# Patient Record
Sex: MALE | Race: WHITE | NOT HISPANIC OR LATINO | Employment: FULL TIME | URBAN - METROPOLITAN AREA
[De-identification: names, ages, dates, MRNs, and addresses within clinical notes are randomized per-mention and may not be internally consistent; named-entity substitution may affect disease eponyms.]

---

## 2019-03-11 ENCOUNTER — HISTORICAL (OUTPATIENT)
Dept: RADIOLOGY | Facility: HOSPITAL | Age: 54
End: 2019-03-11

## 2019-04-18 LAB
BUN SERPL-MCNC: 21 MG/DL (ref 7–18)
CALCIUM SERPL-MCNC: 9 MG/DL (ref 8.5–10.1)
CHLORIDE SERPL-SCNC: 103 MMOL/L (ref 98–107)
CO2 SERPL-SCNC: 27 MMOL/L (ref 21–32)
CREAT SERPL-MCNC: 1.06 MG/DL (ref 0.7–1.3)
CREAT/UREA NIT SERPL: 20
EST. AVERAGE GLUCOSE BLD GHB EST-MCNC: 174 MG/DL
GLUCOSE SERPL-MCNC: 198 MG/DL (ref 74–106)
HBA1C MFR BLD: 7.7 % (ref 4.2–6.3)
POTASSIUM SERPL-SCNC: 5.4 MMOL/L (ref 3.5–5.1)
SODIUM SERPL-SCNC: 138 MMOL/L (ref 136–145)

## 2019-04-25 ENCOUNTER — HISTORICAL (OUTPATIENT)
Dept: SURGERY | Facility: HOSPITAL | Age: 54
End: 2019-04-25

## 2020-06-01 ENCOUNTER — HISTORICAL (OUTPATIENT)
Dept: ADMINISTRATIVE | Facility: HOSPITAL | Age: 55
End: 2020-06-01

## 2020-06-18 ENCOUNTER — HISTORICAL (OUTPATIENT)
Dept: RADIOLOGY | Facility: HOSPITAL | Age: 55
End: 2020-06-18

## 2020-06-26 LAB
BUN SERPL-MCNC: 17.4 MG/DL (ref 8.4–25.7)
CALCIUM SERPL-MCNC: 9.4 MG/DL (ref 8.4–10.2)
CHLORIDE SERPL-SCNC: 102 MMOL/L (ref 98–107)
CO2 SERPL-SCNC: 25 MMOL/L (ref 22–29)
CREAT SERPL-MCNC: 0.98 MG/DL (ref 0.72–1.25)
CREAT/UREA NIT SERPL: 18
EST. AVERAGE GLUCOSE BLD GHB EST-MCNC: 154.2 MG/DL
GLUCOSE SERPL-MCNC: 260 MG/DL (ref 74–100)
HBA1C MFR BLD: 7 %
POTASSIUM SERPL-SCNC: 4.2 MMOL/L (ref 3.5–5.1)
SODIUM SERPL-SCNC: 138 MMOL/L (ref 136–145)

## 2020-07-02 ENCOUNTER — HISTORICAL (OUTPATIENT)
Dept: SURGERY | Facility: HOSPITAL | Age: 55
End: 2020-07-02

## 2022-04-09 ENCOUNTER — HISTORICAL (OUTPATIENT)
Dept: ADMINISTRATIVE | Facility: HOSPITAL | Age: 57
End: 2022-04-09
Payer: COMMERCIAL

## 2022-04-29 VITALS
HEIGHT: 72 IN | BODY MASS INDEX: 28.31 KG/M2 | BODY MASS INDEX: 27.41 KG/M2 | HEIGHT: 72 IN | WEIGHT: 209 LBS | SYSTOLIC BLOOD PRESSURE: 132 MMHG | DIASTOLIC BLOOD PRESSURE: 72 MMHG | DIASTOLIC BLOOD PRESSURE: 86 MMHG | WEIGHT: 202.38 LBS | SYSTOLIC BLOOD PRESSURE: 113 MMHG

## 2022-04-30 NOTE — OP NOTE
DATE OF SURGERY:    04/25/2019    SURGEON:  Will Goldman Jr, MD    PREOPERATIVE DIAGNOSES:    1. Left elbow lateral epicondylitis, refractory.  2. Right elbow lateral epicondylitis.    POSTOPERATIVE DIAGNOSES:    1. Left elbow lateral epicondylitis, refractory.  2. Right elbow lateral epicondylitis.    PROCEDURE:    1. Left elbow open lateral epicondylitis debridement with tendon repair.  2. Right elbow lateral epicondyle injection.    ASSISTANT:  None.    ANESTHESIA:  General plus regional.    COMPLICATIONS:  None.    IMPLANTS:  Arthrex.    HISTORY OF PRESENT ILLNESS:  Kulwinder is a very pleasant, 54-year-old, who has had a longstanding history of left lateral epicondylitis which has failed conservative measures.  I recommended repair.  At the same setting, we will go ahead and inject his right elbow.  I discussed with him the risks, benefits, and alternatives to therapy, and he elected to proceed.    PROCEDURE IN DETAIL:  Kulwinder was initially seen in the preoperative unit, where his history and physical were reviewed without change.  His left elbow was marked.  His consents were reviewed.  All questions were answered.  He was taken to the operating room and placed supine on the operating room table where general anesthesia was induced.  His left upper extremity was prepped and draped in a sterile fashion.  Attending led timeout, confirmed the operative side.  Preoperative antibiotics administered at the beginning of the procedure.     I started by making an incision directly over the lateral epicondyle.  I sharply incised the skin and subcutaneous tissue.  I identified the forearm fascia, and then the interval between the ECRL and the EDC.  I came down through here and lifted it up, and was able to visualize the ECRB, which was attenuated and torn.  I cleaned up the inflammatory tissue sharply with a 15 blade, being mindful of moving posteriorly as to not create posterior lateral instability.  Then after  debriding out all inflammatory tissue, I then rongeured back the lateral epicondyle, using a combination of a rongeur and a curet.  I then stimulated the lateral epicondyle with multiple drill holes, and then placed a suture anchor in the center portion of the lateral condyle.  I used these sutures in order to tie back down the tendon to the bone, and then in a side-to-side fashion, I completed the rest of the repair.  Happy with this, I checked his motion of his elbow, which was full, and he had no instability.  His incision was closed in layers.  Sterile dressing was placed.  He was placed into a long-arm splint and awoken from anesthesia.       I then turned my attention to the right elbow, and placed an injection of 1 mL of steroids and 1 mL of bupivacaine onto the lateral condyle.  He tolerated the procedure well and was transferred to the postop unit in good condition.        ______________________________  Will Goldman Jr, MD    BEE/  DD:  04/25/2019  Time:  09:47AM  DT:  04/25/2019  Time:  10:01AM  Job #:  [id number]

## 2022-05-02 NOTE — HISTORICAL [facility] CERNER
This is a historical note converted from Isamar. Formatting and pictures may have been removed.  Please reference Isamar for original formatting and attached multimedia. Chief Complaint  left shoulder pain x ray today no injury or sx  History of Present Illness  He is a pleasant 55-year-old whose had left shoulder pain?since February 2020. ?The pain is located anteriorly over the shoulder. ?He is noticed it worse with extension and internal rotation.? It somewhat better with rest.? He is tried anti-inflammatory medicines without relief. ?He denies any numbness or tingling  Review of Systems  Comprehensive review of system?was performed with no exceptions other than noted in the history of present illness  Physical Exam  Vitals & Measurements  T:?97.7? ?F (Oral)? HR:?72(Apical)? BP:?132/86?  HT:?182.8?cm? WT:?94.8?kg? BMI:?28.37?  Gen: WN, WD, NAD  Card/Res: NL breathing, +distal pulses  Abdomen: ND  Shoulder Exam:??????????Right??????????Left  Skin:??????????????????????????????Normal???????Normal  AC joint tenderness:??????????None??????????None  Forward Flexion:?????????????180 ??????????170  Abduction:?????????????????????180??????????? 180  External Rotation: ????????????? 80??????????????80  Internal Rotation?????????????? 80????????????? 40  Supraspinatus stress test?????? Neg??????????Neg  Rosas Impingement:?? ?????Neg???????????+  Neer Impingement:?????????????Neg??????????? +  Apprehension:???????????????????? Neg??????????Neg  OBriens:????????????????????????????Neg????????? Neg  Speeds test:??????????????????????? Neg??????????Neg  Strength:  External Rotation:???????????????5/5???????????????5/5  Lift Off/belly press:????????????5/5???????????????5/5  ?   N-V status:?????????????????????????Intact??????????Intact  ?   C-spine: Normal ROM, NT  ?  ?  Assessment/Plan  1.?Biceps tendinitis?M75.20  ?We will try an injection today. ?If his pain persist we will get MRI  ?  Risks of cortisone were discussed  with the patient including hypopigmentation subcutaneous fat atrophy, and post injection pain flare. The patient understood these risks and requested to proceed. The left shoulder was prepped with betadine. The 1cc of steroid and 3cc of lidocaine injection was administered under sterile techinique. The injection was administered in clinic by meWill, and the patient tolerated the procedure well.  ?  Ordered:  betamethasone, 12 mg, Intra-Articular, Once, first dose 06/01/20 10:00:00 CDT, stop date 06/01/20 10:00:00 CDT  Lidocaine inj., 2 mL, Intra-Articular, Once, first dose 06/01/20 9:17:00 CDT, stop date 06/01/20 9:17:00 CDT  asp/inj jnt/bursa, major [id number] PC, 06/01/20 9:17:00 CDT, [facility], Routine, 06/01/20 9:17:00 CDT, Biceps tendinitis  Office/Outpatient Visit Level 3 Established [id number] PC, Biceps tendinitis, [facility], 06/01/20 9:17:00 CDT  ?  Orders:  Clinic Follow-up PRN, 06/01/20 9:17:00 CDT, Future Order, [facility]  XR Shoulder Left Minimum 2 Views, Routine, 06/01/20 8:53:00 CDT, None, Ambulatory, Rad Type, Left shoulder pain, Not Scheduled, 06/01/20 8:53:00 CDT  Referrals  Clinic Follow-up PRN, 06/01/20 9:17:00 CDT, Future Order, [facility]   Problem List/Past Medical History  Ongoing  Diabetes mellitus  High cholesterol  Lateral epicondylitis  Pain in left foot  Paresthesia of both feet  Right foot pain  Historical  No qualifying data  Procedure/Surgical History  Epicondyle Repair (Left) (04/25/2019)  Excision of Left Upper Arm Subcutaneous Tissue and Fascia, Open Approach (04/25/2019)  Injection(s); single tendon sheath, or ligament, aponeurosis (eg, plantar fascia) (04/25/2019)  Injection, anesthetic agent; brachial plexus, single (04/25/2019)  Introduction of Anesthetic Agent into Joints, Percutaneous Approach (04/25/2019)  Introduction of Anesthetic Agent into Peripheral Nerves and Plexi, Percutaneous Approach (04/25/2019)  Introduction of Anti-inflammatory  into Joints, Percutaneous Approach (04/25/2019)  Repair Left Upper Arm Tendon, Open Approach (04/25/2019)  Tenotomy, elbow, lateral or medial (eg, epicondylitis, tennis elbow, golfers elbow); debridement, soft tissue and/or bone, open with tendon repair or reattachment (04/25/2019)  Facial Reconstruction  Right Knee   Medications  atorvastatin 20 mg oral tablet, 20 mg= 1 tab(s), Oral, Daily  Celestone, 12 mg, Intra-Articular, Once  codeine-promethazine 10 mg-6.25 mg/5 mL oral syrup, Oral, qPM,? ?Not Taking, Completed Rx: Last Dose Date/Time Unknown  Fish Oil oral capsule, 1 cap(s), Oral, Daily  Foltanx, 1 tab(s), Oral, BID  gabapentin 300 mg oral capsule, 600 mg= 2 cap(s), Oral, TID  glipiZIDE 10 mg oral tablet, extended release (XL tab), 10 mg= 1 tab(s), Oral, Daily  hydrocortisone 10% topical gel, See Instructions  Januvia 100 mg oral tablet, 100 mg= 1 tab(s), Oral, Daily  Januvia 50 mg oral tablet, 50 mg= 1 tab(s), Oral, Daily  lidocaine 2% injectable solution, 2 mL, Intra-Articular, Once  metFORMIN 1000 mg oral tablet, 1000 mg= 1 tab(s), Oral, BID  Mobic 15 mg oral tablet, 15 mg= 1 tab(s), Oral, Daily,? ?Not Taking, Completed Rx: Last Dose Date/Time Unknown  prednisONE 20 mg oral tablet, 20 mg= 1 tab(s), Oral, Daily,? ?Not Taking, Completed Rx: Last Dose Date/Time Unknown  ROSUVASTATIN CALCIUM 10 MG TAB, 10 mg= 1 tab(s), Oral, qPM  Toradol 10 mg oral tablet, 10 mg= 1 tab(s), Oral, q8hr,? ?Not Taking, Completed Rx: Last Dose Date/Time Unknown Last Dose Date/Time Unknown  Vitamin B12  Zofran 4 mg oral tablet, 4 mg= 1 tab(s), Oral, q6hr,? ?Not Taking, Completed Rx: Last Dose Date/Time Unknown  zolpidem 10 mg oral tablet, 10 mg= 1 tab(s), Oral, Once a day (at bedtime), PRN  Allergies  No Known Medication Allergies  Social History  Abuse/Neglect  No, 11/25/2019  Alcohol  Current, Beer, Daily, 04/18/2019  Employment/School  Employed, 04/18/2019  Exercise  Exercise frequency: 1-2 times/week. Exercise type: Walking.,  04/18/2019  Home/Environment  Lives with Children, Spouse., 04/18/2019  Nutrition/Health  Diabetic, 04/18/2019  Substance Use  Never, 10/19/2016  Tobacco  Never (less than 100 in lifetime), No, 11/25/2019  Family History  CAD - Coronary artery disease: Father.  Cardiac arrest.: Father.  Diabetes mellitus type 2: Mother.  Pancreatic cancer: Mother.  Primary malignant neoplasm of brain: Brother.  Health Maintenance  Health Maintenance  ???Pending?(in the next year)  ??? ??OverDue  ??? ? ? ?Diabetes Screening due??and every?  ??? ? ? ?Diabetes Maintenance-Serum Creatinine due??04/18/20??and every 1??year(s)  ??? ??Due?  ??? ? ? ?Depression Screening due??05/05/20??and every 1??year(s)  ??? ? ? ?ADL Screening due??06/01/20??and every 1??year(s)  ??? ? ? ?Aspirin Therapy for CVD Prevention due??06/01/20??and every 1??year(s)  ??? ? ? ?Colorectal Screening due??06/01/20??and every?  ??? ? ? ?Diabetes Maintenance-Eye Exam due??06/01/20??and every?  ??? ? ? ?Diabetes Maintenance-Fasting Lipid Profile due??06/01/20??and every?  ??? ? ? ?Diabetes Maintenance-Foot Exam due??06/01/20??and every?  ??? ? ? ?Diabetes Maintenance-Microalbumin due??06/01/20??Variable frequency  ??? ? ? ?Diabetes Maintenance-Urine Dipstick due??06/01/20??Variable frequency  ??? ? ? ?Tetanus Vaccine due??06/01/20??and every 10??year(s)  ??? ??Due In Future?  ??? ? ? ?Diabetes Maintenance-HgbA1c not due until??07/30/20??and every 1??year(s)  ??? ? ? ?Alcohol Misuse Screening not due until??01/01/21??and every 1??year(s)  ??? ? ? ?Obesity Screening not due until??01/01/21??and every 1??year(s)  ??? ? ? ?Smoking Cessation (Diabetes) not due until??03/12/21??and every 2??year(s)  ???Satisfied?(in the past 1 year)  ??? ??Satisfied?  ??? ? ? ?Alcohol Misuse Screening on??06/01/20.??Satisfied by Peg Bynum  ??? ? ? ?Blood Pressure Screening on??06/01/20.??Satisfied by Peg Bynum  ??? ? ? ?Body Mass Index Check on??06/01/20.??Satisfied by Misa  Peg  ??? ? ? ?Obesity Screening on??06/01/20.??Satisfied by Peg Bynum  ?  Diagnostic Results  Shoulder radiographs show a?no arthrosis.

## 2022-05-02 NOTE — HISTORICAL [facility]
This is a historical note converted from Isamar. Formatting and pictures may have been removed.  Please reference Isamar for original formatting and attached multimedia. History of Present Illness  He is a pleasant 53-year-old male whose had?bilateral elbow pain right greater than left since 2016. ?The pains located?over his lateral epicondyles. 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 The?pain is worse with motion. ?Somewhat better with rest. ?He is tried?inflammatory medicines without significant relief. ?He had injections with steroids in the past without relief. ?He is tried formal physical therapy?and bracing. ?Despite he said continued pain.? He denies any numbness or tingling [1]  ?   We tried a steroid injection October 2018 & in December 2018?with some transient relief. He returns status post MRI. [1]  Review of Systems  Comprehensive review of system?was performed with no exceptions other than noted in the history of present illness [2]  Physical Exam  Vitals & Measurements  T:?36.6? ?C (Temporal Artery)? HR:?75(Monitored)? RR:?20? BP:?141/73? SpO2:?93%? WT:?94.8?kg?  Gen: WN, WD, NAD  Card/Res: NL breathing, +distal pulses  Abdomen: ND  Muscular skeletal: He has tender palpation of her bilateral lateral epicondyles. ?He is got pain with forced wrist extension and finger extension.? Distally his neurovascular intact [3]  Assessment/Plan  1.?Lateral epicondylitis?M77.10  ? I recommended surgical intervention:?Left?open?lateral epicondyle debridement and repair,?right lateral epicondyle injection. [4]  Orders:  ceFAZolin, 2 gm, form: Infusion, IV Piggyback, On Call, Infuse over: 30 minute(s), first dose 04/25/19 6:13:00 CDT  Consult to Anesthesia, 04/25/19 6:13:00 CDT, Evaluate for Block  NPO, 04/25/19 6:13:00 CDT, CM NPO  Obtain consent, 04/25/19 6:13:00 CDT, Constant Order, Please have consent signed and witnessed; Consent on chart; Already discussed risks, benefits, and options with patient /  family  Peripheral IV Insertion, 04/25/19 6:13:00 CDT, 18 gauge  Place in Outpatient Bedded Status, 04/25/19 6:13:00 CDT, Ambulatory Surgery Jones PEREZ MD, Will SANTO, No  Prep and Clip, 04/25/19 6:13:00 CDT, Cleanse operative extremity and please clip operative extremity if needed   Problem List/Past Medical History  Ongoing  Diabetes mellitus  High cholesterol  Lateral epicondylitis  Pain in left foot  Paresthesia of both feet  Right foot pain  Historical  No qualifying data  Procedure/Surgical History  Facial Reconstruction  Right Knee   Medications  Inpatient  cefazolin 2gm/50ml D5W (Premix), 2 gm= 50 mL, IV Piggyback, On Call  Home  atorvastatin 20 mg oral tablet, 20 mg= 1 tab(s), Oral, Daily,? ?Not Taking per Prescriber: change in medication  codeine-promethazine 10 mg-6.25 mg/5 mL oral syrup, Oral, qPM,? ?Not Taking, Completed Rx  Fish Oil oral capsule, 1 cap(s), Oral, Daily  Foltanx, 1 tab(s), Oral, BID,? ?Not taking  gabapentin 300 mg oral capsule, 600 mg= 2 cap(s), Oral, TID  glipiZIDE 10 mg oral tablet, extended release (XL tab), 10 mg= 1 tab(s), Oral, Daily  hydrocortisone 10% topical gel, See Instructions,? ?Not Taking, Completed Rx  Januvia 100 mg oral tablet, 100 mg= 1 tab(s), Oral, Daily,? ?Not Taking per Prescriber: change in dosage  Januvia 50 mg oral tablet, 50 mg= 1 tab(s), Oral, Daily  metFORMIN 1000 mg oral tablet, 1000 mg= 1 tab(s), Oral, BID  Mobic 15 mg oral tablet, 15 mg= 1 tab(s), Oral, Daily,? ?Not taking  prednisONE 20 mg oral tablet, 20 mg= 1 tab(s), Oral, Daily,? ?Not Taking, Completed Rx  ROSUVASTATIN CALCIUM 10 MG TAB, 10 mg= 1 tab(s), Oral, qPM  Vitamin B12  zolpidem 10 mg oral tablet, 10 mg= 1 tab(s), Oral, Once a day (at bedtime), PRN  Allergies  No Known Medication Allergies  Social History  Alcohol  Current, Beer, Daily, 04/18/2019  Current, Beer, 1-2 times per week, 10/19/2016  Employment/School  Employed, 04/18/2019  Exercise  Exercise frequency: 1-2 times/week. Exercise type:  Walking., 04/18/2019  Home/Environment  Lives with Children, Spouse., 04/18/2019  Nutrition/Health  Diabetic, 04/18/2019  Substance Abuse  Never, 10/19/2016  Tobacco  Never (less than 100 in lifetime), N/A, 04/25/2019  Never (less than 100 in lifetime), N/A, 04/18/2019  Never (less than 100 in lifetime), No, 03/13/2019  Never (less than 100 in lifetime), No, 03/04/2019  Never smoker, No, 12/26/2018  Never smoker, 10/19/2016  Family History  CAD - Coronary artery disease: Father.  Cardiac arrest.: Father.  Diabetes mellitus type 2: Mother.  Pancreatic cancer: Mother.  Primary malignant neoplasm of brain: Brother.     [1]?Office Visit Note; Will Goldman JR., MD 03/13/2019 16:48 CDT  [2]?Office Visit Note; Will Goldman JR., MD 03/13/2019 16:48 CDT  [3]?Office Visit Note; Will Goldman JR., MD 03/13/2019 16:48 CDT  [4]?Office Visit Note; Will Goldman JR., MD 03/13/2019 16:48 CDT

## 2022-05-17 DIAGNOSIS — M75.20 BICEPS TENDINITIS, UNSPECIFIED LATERALITY: Primary | ICD-10-CM

## 2022-05-17 DIAGNOSIS — M25.511 ACUTE PAIN OF RIGHT SHOULDER: ICD-10-CM

## 2022-05-31 ENCOUNTER — HOSPITAL ENCOUNTER (OUTPATIENT)
Dept: RADIOLOGY | Facility: HOSPITAL | Age: 57
Discharge: HOME | End: 2022-05-31
Attending: ORTHOPAEDIC SURGERY
Payer: COMMERCIAL

## 2022-05-31 DIAGNOSIS — M25.511 ACUTE PAIN OF RIGHT SHOULDER: ICD-10-CM

## 2022-05-31 DIAGNOSIS — M75.20 BICEPS TENDINITIS, UNSPECIFIED LATERALITY: ICD-10-CM

## 2022-05-31 PROCEDURE — 73221 MRI JOINT UPR EXTREM W/O DYE: CPT | Mod: TC,RT

## 2022-06-01 ENCOUNTER — OFFICE VISIT (OUTPATIENT)
Dept: ORTHOPEDICS | Facility: CLINIC | Age: 57
End: 2022-06-01
Payer: COMMERCIAL

## 2022-06-01 VITALS — BODY MASS INDEX: 27.41 KG/M2 | HEIGHT: 72 IN | WEIGHT: 202.38 LBS

## 2022-06-01 DIAGNOSIS — S43.431A SUPERIOR GLENOID LABRUM LESION OF RIGHT SHOULDER, INITIAL ENCOUNTER: Primary | ICD-10-CM

## 2022-06-01 DIAGNOSIS — M75.01 ADHESIVE CAPSULITIS OF RIGHT SHOULDER: ICD-10-CM

## 2022-06-01 PROCEDURE — 20610 LARGE JOINT ASPIRATION/INJECTION: R SUBACROMIAL BURSA: ICD-10-PCS | Mod: RT,,, | Performed by: ORTHOPAEDIC SURGERY

## 2022-06-01 PROCEDURE — 1159F MED LIST DOCD IN RCRD: CPT | Mod: CPTII,,, | Performed by: ORTHOPAEDIC SURGERY

## 2022-06-01 PROCEDURE — 3008F BODY MASS INDEX DOCD: CPT | Mod: CPTII,,, | Performed by: ORTHOPAEDIC SURGERY

## 2022-06-01 PROCEDURE — 99213 OFFICE O/P EST LOW 20 MIN: CPT | Mod: 25,,, | Performed by: ORTHOPAEDIC SURGERY

## 2022-06-01 PROCEDURE — 99213 PR OFFICE/OUTPT VISIT, EST, LEVL III, 20-29 MIN: ICD-10-PCS | Mod: 25,,, | Performed by: ORTHOPAEDIC SURGERY

## 2022-06-01 PROCEDURE — 20610 DRAIN/INJ JOINT/BURSA W/O US: CPT | Mod: RT,,, | Performed by: ORTHOPAEDIC SURGERY

## 2022-06-01 PROCEDURE — 1159F PR MEDICATION LIST DOCUMENTED IN MEDICAL RECORD: ICD-10-PCS | Mod: CPTII,,, | Performed by: ORTHOPAEDIC SURGERY

## 2022-06-01 PROCEDURE — 3008F PR BODY MASS INDEX (BMI) DOCUMENTED: ICD-10-PCS | Mod: CPTII,,, | Performed by: ORTHOPAEDIC SURGERY

## 2022-06-01 RX ORDER — ORAL SEMAGLUTIDE 14 MG/1
14 TABLET ORAL DAILY
COMMUNITY
Start: 2022-05-23

## 2022-06-01 RX ORDER — SODIUM CHLORIDE 9 MG/ML
INJECTION, SOLUTION INTRAVENOUS CONTINUOUS
Status: CANCELLED | OUTPATIENT
Start: 2022-07-14

## 2022-06-01 RX ORDER — LIDOCAINE HYDROCHLORIDE 20 MG/ML
5 INJECTION, SOLUTION EPIDURAL; INFILTRATION; INTRACAUDAL; PERINEURAL
Status: DISCONTINUED | OUTPATIENT
Start: 2022-06-01 | End: 2022-06-01 | Stop reason: HOSPADM

## 2022-06-01 RX ORDER — ZOLPIDEM TARTRATE 10 MG/1
10 TABLET ORAL NIGHTLY PRN
COMMUNITY
Start: 2022-05-09

## 2022-06-01 RX ORDER — CEFAZOLIN SODIUM 2 G/50ML
2 SOLUTION INTRAVENOUS
Status: CANCELLED | OUTPATIENT
Start: 2022-07-14

## 2022-06-01 RX ORDER — ROSUVASTATIN CALCIUM 40 MG/1
40 TABLET, COATED ORAL NIGHTLY
COMMUNITY
Start: 2022-05-23

## 2022-06-01 RX ORDER — GABAPENTIN 300 MG/1
300 CAPSULE ORAL 3 TIMES DAILY
COMMUNITY
Start: 2022-05-10

## 2022-06-01 RX ORDER — MUPIROCIN 20 MG/G
OINTMENT TOPICAL
Status: CANCELLED | OUTPATIENT
Start: 2022-07-14

## 2022-06-01 RX ORDER — EMPAGLIFLOZIN 25 MG/1
25 TABLET, FILM COATED ORAL DAILY
COMMUNITY
Start: 2022-05-09

## 2022-06-01 RX ORDER — GLIPIZIDE 10 MG/1
10 TABLET, FILM COATED, EXTENDED RELEASE ORAL 2 TIMES DAILY
COMMUNITY
Start: 2022-03-28

## 2022-06-01 RX ORDER — BETAMETHASONE SODIUM PHOSPHATE AND BETAMETHASONE ACETATE 3; 3 MG/ML; MG/ML
6 INJECTION, SUSPENSION INTRA-ARTICULAR; INTRALESIONAL; INTRAMUSCULAR; SOFT TISSUE
Status: DISCONTINUED | OUTPATIENT
Start: 2022-06-01 | End: 2022-06-01 | Stop reason: HOSPADM

## 2022-06-01 RX ORDER — METFORMIN HYDROCHLORIDE 1000 MG/1
1000 TABLET ORAL 2 TIMES DAILY WITH MEALS
COMMUNITY
Start: 2022-04-13

## 2022-06-01 RX ADMIN — BETAMETHASONE SODIUM PHOSPHATE AND BETAMETHASONE ACETATE 6 MG: 3; 3 INJECTION, SUSPENSION INTRA-ARTICULAR; INTRALESIONAL; INTRAMUSCULAR; SOFT TISSUE at 03:06

## 2022-06-01 RX ADMIN — LIDOCAINE HYDROCHLORIDE 5 ML: 20 INJECTION, SOLUTION EPIDURAL; INFILTRATION; INTRACAUDAL; PERINEURAL at 03:06

## 2022-06-01 NOTE — PROGRESS NOTES
Chief Complaint: No chief complaint on file.      Consulting Physician: No ref. provider found    History of present illness:  He is a pleasant 57-year-old has had right shoulder pain since October 2021. The pains located anteriorly over the shoulder. He notes it worse with abduction and reaching behind him. It somewhat better at rest. He is tried anti-inflammatory medicines and home directed exercise program without relief since October 2021. He denies any new numbness or tingling.    We tried a steroid injection in January of 2022.  Unfortunately his pain has persisted.  He status post MRI      No past medical history on file.    No past surgical history on file.    No current outpatient medications on file.     No current facility-administered medications for this visit.       Review of patient's allergies indicates:  Not on File    No family history on file.    Social History     Socioeconomic History    Marital status:        Review of Systems:    Constitution:   Denies chills, fever, and sweats.  HENT:   Denies headaches or blurry vision.  Cardiovascular:  Denies chest pain or irregular heart beat.  Respiratory:   Denies cough or shortness of breath.  Gastrointestinal:  Denies abdominal pain, nausea, or vomiting.  Musculoskeletal:   Denies muscle cramps.  Neurological:   Denies dizziness or focal weakness.  Psychiatric/Behavior: Normal mental status.  Hematology/Lymph:  Denies bleeding problem or easy bruising/bleeding.  Skin:    Denies rash or suspicious lesions.    Examination:    Vital Signs:  There were no vitals filed for this visit.    There is no height or weight on file to calculate BMI.    Constitution:   Well-developed, well nourished patient in no acute distress.  Neurological:   Alert and oriented x 3 and cooperative to examination.     Psychiatric/Behavior: Normal mental status.  Respiratory:   No shortness of breath.  Eyes:    Extraoccular muscles intact  Skin:    No scars, rash or  suspicious lesions.    MSK:   .Shoulder Exam:                   Right        Left  Skin:                                   Normal     Normal  AC joint tenderness:           None         None  Forward Flexion:                100            180  Abduction:                          100           180  External Rotation:               40              80  Internal Rotation:                10             80  Supraspinatus stress test: Neg           Neg  Hawkin's Impingement:       +           Neg  Neer Impingement:              +           Neg  Apprehension:                   Neg           Neg  [place]'s:                            +           Neg  Speed's test:                     Neg            Neg  Strength:  External Rotation:           5/5                5/5  Lift Off/belly press:          5/5                5/5    N-V status:                   Intact             Intact    C-spine: Normal ROM, NT      Imaging: FINDINGS:  Severe edematous thickening of the axillary recess extending into the shoulder capsule essentially circumferentially.  Type 3 superior labral tear without involvement of the biceps tendon anchor.       Assessment: Superior glenoid labrum lesion of right shoulder, initial encounter    Adhesive capsulitis of right shoulder        Plan:  I recommend surgical intervention:  Right shoulder arthroscopic biceps tenodesis, lysis of adhesions, manipulation under anesthesia.  We discussed the details of the procedure and expected postoperative course.  We discussed the benefits of surgery which we did decrease his pain and increase his function.  We discussed the risks of surgery which is small but could be significant if he has continued pain, stiffness, or infection.  After discussion he would like to proceed.  Plans for surgery July 12

## 2022-06-01 NOTE — PROCEDURES
Large Joint Aspiration/Injection: R subacromial bursa    Date/Time: 6/1/2022 3:15 PM  Performed by: Will Goldman Jr., MD  Authorized by: Will Goldman Jr., MD     Consent Done?:  Yes (Verbal)  Indications:  Arthritis  Site marked: the procedure site was marked    Timeout: prior to procedure the correct patient, procedure, and site was verified    Prep: patient was prepped and draped in usual sterile fashion    Local anesthesia used?: No      Details:  Needle Size:  21 G  Ultrasonic Guidance for needle placement?: No    Approach:  Lateral  Location:  Shoulder  Site:  R subacromial bursa  Medications:  5 mL LIDOcaine (PF) 20 mg/mL (2%) 20 mg/mL (2 %); 6 mg betamethasone acetate-betamethasone sodium phosphate 6 mg/mL  Patient tolerance:  Patient tolerated the procedure well with no immediate complications

## 2022-07-07 PROCEDURE — 93005 ELECTROCARDIOGRAM TRACING: CPT | Mod: ,,, | Performed by: ORTHOPAEDIC SURGERY

## 2022-07-07 PROCEDURE — 93005 EKG 12-LEAD: ICD-10-PCS | Mod: ,,, | Performed by: ORTHOPAEDIC SURGERY

## 2022-07-11 NOTE — H&P
Chief Complaint: No chief complaint on file.      Consulting Physician: Self, Aaareferral    History of present illness:  He is a pleasant 57-year-old has had right shoulder pain since October 2021. The pains located anteriorly over the shoulder. He notes it worse with abduction and reaching behind him. It somewhat better at rest. He is tried anti-inflammatory medicines and home directed exercise program without relief since October 2021. He denies any new numbness or tingling.    We tried a steroid injection in January of 2022.  Unfortunately his pain has persisted.  He status post MRI      No past medical history on file.    No past surgical history on file.    No current facility-administered medications for this encounter.     Current Outpatient Medications   Medication Sig    gabapentin (NEURONTIN) 300 MG capsule     glipiZIDE (GLUCOTROL) 10 MG TR24     JARDIANCE 25 mg tablet     linaCLOtide (LINZESS) 145 mcg Cap capsule Take 145 mcg by mouth.    metFORMIN (GLUCOPHAGE) 1000 MG tablet     rosuvastatin (CRESTOR) 40 MG Tab     RYBELSUS 14 mg tablet     zolpidem (AMBIEN) 10 mg Tab        Review of patient's allergies indicates:  No Known Allergies    No family history on file.    Social History     Socioeconomic History    Marital status:    Tobacco Use    Smoking status: Never Smoker    Smokeless tobacco: Former User   Substance and Sexual Activity    Alcohol use: Never    Drug use: Never       Review of Systems:    Constitution:   Denies chills, fever, and sweats.  HENT:   Denies headaches or blurry vision.  Cardiovascular:  Denies chest pain or irregular heart beat.  Respiratory:   Denies cough or shortness of breath.  Gastrointestinal:  Denies abdominal pain, nausea, or vomiting.  Musculoskeletal:   Denies muscle cramps.  Neurological:   Denies dizziness or focal weakness.  Psychiatric/Behavior: Normal mental status.  Hematology/Lymph:  Denies bleeding problem or easy  bruising/bleeding.  Skin:    Denies rash or suspicious lesions.    Examination:    Vital Signs:  There were no vitals filed for this visit.    There is no height or weight on file to calculate BMI.    Constitution:   Well-developed, well nourished patient in no acute distress.  Neurological:   Alert and oriented x 3 and cooperative to examination.     Psychiatric/Behavior: Normal mental status.  Respiratory:   No shortness of breath.  Eyes:    Extraoccular muscles intact  Skin:    No scars, rash or suspicious lesions.    MSK:   .Shoulder Exam:                   Right        Left  Skin:                                   Normal     Normal  AC joint tenderness:           None         None  Forward Flexion:                100            180  Abduction:                          100           180  External Rotation:               40              80  Internal Rotation:                10             80  Supraspinatus stress test: Neg           Neg  Hawkin's Impingement:       +           Neg  Neer Impingement:              +           Neg  Apprehension:                   Neg           Neg  [place]'s:                            +           Neg  Speed's test:                     Neg            Neg  Strength:  External Rotation:           5/5                5/5  Lift Off/belly press:          5/5                5/5    N-V status:                   Intact             Intact    C-spine: Normal ROM, NT      Imaging: FINDINGS:  Severe edematous thickening of the axillary recess extending into the shoulder capsule essentially circumferentially.  Type 3 superior labral tear without involvement of the biceps tendon anchor.       Assessment: Superior glenoid labrum lesion of right shoulder, initial encounter     Adhesive capsulitis of right shoulder    Plan:  I recommend surgical intervention:  Right shoulder arthroscopic biceps tenodesis, lysis of adhesions, manipulation under anesthesia.  We discussed the details of the procedure  and expected postoperative course.  We discussed the benefits of surgery which we did decrease his pain and increase his function.  We discussed the risks of surgery which is small but could be significant if he has continued pain, stiffness, or infection.  After discussion he would like to proceed.  Plans for surgery July 12

## 2022-07-12 RX ORDER — PNV NO.95/FERROUS FUM/FOLIC AC 28MG-0.8MG
100 TABLET ORAL DAILY
COMMUNITY

## 2022-07-12 RX ORDER — UBIDECARENONE 30 MG
30 CAPSULE ORAL 3 TIMES DAILY
COMMUNITY

## 2022-07-13 ENCOUNTER — ANESTHESIA EVENT (OUTPATIENT)
Dept: SURGERY | Facility: HOSPITAL | Age: 57
End: 2022-07-13
Payer: COMMERCIAL

## 2022-07-14 ENCOUNTER — ANESTHESIA (OUTPATIENT)
Dept: SURGERY | Facility: HOSPITAL | Age: 57
End: 2022-07-14
Payer: COMMERCIAL

## 2022-07-14 ENCOUNTER — HOSPITAL ENCOUNTER (OUTPATIENT)
Facility: HOSPITAL | Age: 57
Discharge: HOME | End: 2022-07-14
Attending: ORTHOPAEDIC SURGERY | Admitting: ORTHOPAEDIC SURGERY
Payer: COMMERCIAL

## 2022-07-14 DIAGNOSIS — S43.431A SUPERIOR GLENOID LABRUM LESION OF RIGHT SHOULDER, INITIAL ENCOUNTER: Primary | ICD-10-CM

## 2022-07-14 DIAGNOSIS — M75.01 ADHESIVE CAPSULITIS OF RIGHT SHOULDER: ICD-10-CM

## 2022-07-14 LAB — POCT GLUCOSE: 121 MG/DL (ref 70–110)

## 2022-07-14 PROCEDURE — 29823 SHO ARTHRS SRG XTNSV DBRDMT: CPT | Mod: AS,51,RT, | Performed by: NURSE PRACTITIONER

## 2022-07-14 PROCEDURE — 29828 PR ARTHROSCOPY SHOULDER SURGICAL BICEPS TENODESIS: ICD-10-PCS | Mod: RT,,, | Performed by: ORTHOPAEDIC SURGERY

## 2022-07-14 PROCEDURE — 63600175 PHARM REV CODE 636 W HCPCS

## 2022-07-14 PROCEDURE — 36000711: Performed by: ORTHOPAEDIC SURGERY

## 2022-07-14 PROCEDURE — 63600175 PHARM REV CODE 636 W HCPCS: Performed by: NURSE ANESTHETIST, CERTIFIED REGISTERED

## 2022-07-14 PROCEDURE — 71000016 HC POSTOP RECOV ADDL HR: Performed by: ORTHOPAEDIC SURGERY

## 2022-07-14 PROCEDURE — 64415 NJX AA&/STRD BRCH PLXS IMG: CPT | Mod: 59,RT | Performed by: ANESTHESIOLOGY

## 2022-07-14 PROCEDURE — 71000015 HC POSTOP RECOV 1ST HR: Performed by: ORTHOPAEDIC SURGERY

## 2022-07-14 PROCEDURE — 29828 SHO ARTHRS SRG BICP TENODSIS: CPT | Mod: RT,,, | Performed by: ORTHOPAEDIC SURGERY

## 2022-07-14 PROCEDURE — 29828 SHO ARTHRS SRG BICP TENODSIS: CPT | Mod: AS,RT,, | Performed by: NURSE PRACTITIONER

## 2022-07-14 PROCEDURE — 36000710: Performed by: ORTHOPAEDIC SURGERY

## 2022-07-14 PROCEDURE — 37000008 HC ANESTHESIA 1ST 15 MINUTES: Performed by: ORTHOPAEDIC SURGERY

## 2022-07-14 PROCEDURE — 25000003 PHARM REV CODE 250

## 2022-07-14 PROCEDURE — 27201423 OPTIME MED/SURG SUP & DEVICES STERILE SUPPLY: Performed by: ORTHOPAEDIC SURGERY

## 2022-07-14 PROCEDURE — 25000003 PHARM REV CODE 250: Performed by: NURSE ANESTHETIST, CERTIFIED REGISTERED

## 2022-07-14 PROCEDURE — 29823 PR SHLDR ARTHROSCOP,EXTEN DEBRIDE: ICD-10-PCS | Mod: AS,51,RT, | Performed by: NURSE PRACTITIONER

## 2022-07-14 PROCEDURE — 82962 GLUCOSE BLOOD TEST: CPT | Performed by: ORTHOPAEDIC SURGERY

## 2022-07-14 PROCEDURE — 29823 PR SHLDR ARTHROSCOP,EXTEN DEBRIDE: ICD-10-PCS | Mod: 51,RT,, | Performed by: ORTHOPAEDIC SURGERY

## 2022-07-14 PROCEDURE — 63600175 PHARM REV CODE 636 W HCPCS: Performed by: ANESTHESIOLOGY

## 2022-07-14 PROCEDURE — 37000009 HC ANESTHESIA EA ADD 15 MINS: Performed by: ORTHOPAEDIC SURGERY

## 2022-07-14 PROCEDURE — 29823 SHO ARTHRS SRG XTNSV DBRDMT: CPT | Mod: 51,RT,, | Performed by: ORTHOPAEDIC SURGERY

## 2022-07-14 PROCEDURE — 29828 PR ARTHROSCOPY SHOULDER SURGICAL BICEPS TENODESIS: ICD-10-PCS | Mod: AS,RT,, | Performed by: NURSE PRACTITIONER

## 2022-07-14 PROCEDURE — 71000039 HC RECOVERY, EACH ADD'L HOUR: Performed by: ORTHOPAEDIC SURGERY

## 2022-07-14 PROCEDURE — 71000033 HC RECOVERY, INTIAL HOUR: Performed by: ORTHOPAEDIC SURGERY

## 2022-07-14 PROCEDURE — C1713 ANCHOR/SCREW BN/BN,TIS/BN: HCPCS | Performed by: ORTHOPAEDIC SURGERY

## 2022-07-14 DEVICE — SWVLK TENO BIO-COMP 7X 19.5MM
Type: IMPLANTABLE DEVICE | Site: SHOULDER | Status: FUNCTIONAL
Brand: ARTHREX®

## 2022-07-14 RX ORDER — PHENYLEPHRINE HYDROCHLORIDE 10 MG/ML
INJECTION INTRAVENOUS
Status: DISCONTINUED | OUTPATIENT
Start: 2022-07-14 | End: 2022-07-14

## 2022-07-14 RX ORDER — ROPIVACAINE HYDROCHLORIDE 5 MG/ML
INJECTION, SOLUTION EPIDURAL; INFILTRATION; PERINEURAL
Status: COMPLETED
Start: 2022-07-14 | End: 2022-07-14

## 2022-07-14 RX ORDER — DEXAMETHASONE SODIUM PHOSPHATE 4 MG/ML
INJECTION, SOLUTION INTRA-ARTICULAR; INTRALESIONAL; INTRAMUSCULAR; INTRAVENOUS; SOFT TISSUE
Status: DISCONTINUED | OUTPATIENT
Start: 2022-07-14 | End: 2022-07-14

## 2022-07-14 RX ORDER — FENTANYL CITRATE 50 UG/ML
100 INJECTION, SOLUTION INTRAMUSCULAR; INTRAVENOUS ONCE
Status: COMPLETED | OUTPATIENT
Start: 2022-07-14 | End: 2022-07-14

## 2022-07-14 RX ORDER — LIDOCAINE HYDROCHLORIDE 20 MG/ML
INJECTION, SOLUTION EPIDURAL; INFILTRATION; INTRACAUDAL; PERINEURAL
Status: DISCONTINUED | OUTPATIENT
Start: 2022-07-14 | End: 2022-07-14

## 2022-07-14 RX ORDER — EPINEPHRINE 1 MG/ML
INJECTION, SOLUTION INTRACARDIAC; INTRAMUSCULAR; INTRAVENOUS; SUBCUTANEOUS
Status: DISCONTINUED
Start: 2022-07-14 | End: 2022-07-14 | Stop reason: HOSPADM

## 2022-07-14 RX ORDER — METHOCARBAMOL 750 MG/1
750 TABLET, FILM COATED ORAL 3 TIMES DAILY
Qty: 21 TABLET | Refills: 0 | Status: SHIPPED | OUTPATIENT
Start: 2022-07-14

## 2022-07-14 RX ORDER — HYDROCODONE BITARTRATE AND ACETAMINOPHEN 5; 325 MG/1; MG/1
1 TABLET ORAL EVERY 4 HOURS PRN
Status: DISCONTINUED | OUTPATIENT
Start: 2022-07-14 | End: 2022-07-15 | Stop reason: HOSPADM

## 2022-07-14 RX ORDER — KETOROLAC TROMETHAMINE 30 MG/ML
30 INJECTION, SOLUTION INTRAMUSCULAR; INTRAVENOUS ONCE
Status: COMPLETED | OUTPATIENT
Start: 2022-07-14 | End: 2022-07-14

## 2022-07-14 RX ORDER — PROPOFOL 10 MG/ML
VIAL (ML) INTRAVENOUS
Status: DISCONTINUED | OUTPATIENT
Start: 2022-07-14 | End: 2022-07-14

## 2022-07-14 RX ORDER — ROCURONIUM BROMIDE 10 MG/ML
INJECTION, SOLUTION INTRAVENOUS
Status: DISCONTINUED | OUTPATIENT
Start: 2022-07-14 | End: 2022-07-14

## 2022-07-14 RX ORDER — KETOROLAC TROMETHAMINE 10 MG/1
10 TABLET, FILM COATED ORAL 3 TIMES DAILY
Qty: 15 TABLET | Refills: 0 | Status: SHIPPED | OUTPATIENT
Start: 2022-07-14

## 2022-07-14 RX ORDER — TRAMADOL HYDROCHLORIDE 50 MG/1
50 TABLET ORAL EVERY 4 HOURS PRN
Status: DISCONTINUED | OUTPATIENT
Start: 2022-07-14 | End: 2022-07-15 | Stop reason: HOSPADM

## 2022-07-14 RX ORDER — CEFAZOLIN SODIUM 2 G/50ML
2 SOLUTION INTRAVENOUS
Status: DISCONTINUED | OUTPATIENT
Start: 2022-07-14 | End: 2022-07-15 | Stop reason: HOSPADM

## 2022-07-14 RX ORDER — ONDANSETRON 2 MG/ML
4 INJECTION INTRAMUSCULAR; INTRAVENOUS EVERY 12 HOURS PRN
Status: DISCONTINUED | OUTPATIENT
Start: 2022-07-14 | End: 2022-07-15 | Stop reason: HOSPADM

## 2022-07-14 RX ORDER — MIDAZOLAM HYDROCHLORIDE 1 MG/ML
2 INJECTION INTRAMUSCULAR; INTRAVENOUS ONCE
Status: COMPLETED | OUTPATIENT
Start: 2022-07-14 | End: 2022-07-14

## 2022-07-14 RX ORDER — ROPIVACAINE HYDROCHLORIDE 5 MG/ML
INJECTION, SOLUTION EPIDURAL; INFILTRATION; PERINEURAL
Status: COMPLETED | OUTPATIENT
Start: 2022-07-14 | End: 2022-07-14

## 2022-07-14 RX ORDER — MIDAZOLAM HYDROCHLORIDE 1 MG/ML
INJECTION INTRAMUSCULAR; INTRAVENOUS
Status: COMPLETED
Start: 2022-07-14 | End: 2022-07-14

## 2022-07-14 RX ORDER — MORPHINE SULFATE 4 MG/ML
3 INJECTION, SOLUTION INTRAMUSCULAR; INTRAVENOUS
Status: DISCONTINUED | OUTPATIENT
Start: 2022-07-14 | End: 2022-07-15 | Stop reason: HOSPADM

## 2022-07-14 RX ORDER — FENTANYL CITRATE 50 UG/ML
INJECTION, SOLUTION INTRAMUSCULAR; INTRAVENOUS
Status: COMPLETED
Start: 2022-07-14 | End: 2022-07-14

## 2022-07-14 RX ORDER — KETOROLAC TROMETHAMINE 30 MG/ML
INJECTION, SOLUTION INTRAMUSCULAR; INTRAVENOUS
Status: COMPLETED
Start: 2022-07-14 | End: 2022-07-14

## 2022-07-14 RX ORDER — SODIUM CHLORIDE 0.9 % (FLUSH) 0.9 %
3 SYRINGE (ML) INJECTION EVERY 6 HOURS PRN
Status: DISCONTINUED | OUTPATIENT
Start: 2022-07-14 | End: 2022-07-15 | Stop reason: HOSPADM

## 2022-07-14 RX ORDER — ONDANSETRON 2 MG/ML
INJECTION INTRAMUSCULAR; INTRAVENOUS
Status: DISCONTINUED | OUTPATIENT
Start: 2022-07-14 | End: 2022-07-14

## 2022-07-14 RX ORDER — FENTANYL CITRATE 50 UG/ML
INJECTION, SOLUTION INTRAMUSCULAR; INTRAVENOUS
Status: DISCONTINUED | OUTPATIENT
Start: 2022-07-14 | End: 2022-07-14

## 2022-07-14 RX ORDER — PROMETHAZINE HYDROCHLORIDE 25 MG/1
25 TABLET ORAL EVERY 6 HOURS PRN
Status: DISCONTINUED | OUTPATIENT
Start: 2022-07-14 | End: 2022-07-15 | Stop reason: HOSPADM

## 2022-07-14 RX ORDER — OXYCODONE AND ACETAMINOPHEN 5; 325 MG/1; MG/1
1 TABLET ORAL EVERY 6 HOURS PRN
Qty: 20 TABLET | Refills: 0 | Status: SHIPPED | OUTPATIENT
Start: 2022-07-14

## 2022-07-14 RX ORDER — CEFAZOLIN SODIUM 1 G/3ML
INJECTION, POWDER, FOR SOLUTION INTRAMUSCULAR; INTRAVENOUS
Status: DISCONTINUED | OUTPATIENT
Start: 2022-07-14 | End: 2022-07-14

## 2022-07-14 RX ADMIN — PROPOFOL 100 MG: 10 INJECTION, EMULSION INTRAVENOUS at 10:07

## 2022-07-14 RX ADMIN — PHENYLEPHRINE HYDROCHLORIDE 100 MCG: 10 INJECTION INTRAVENOUS at 11:07

## 2022-07-14 RX ADMIN — LIDOCAINE HYDROCHLORIDE 50 MG: 20 INJECTION, SOLUTION EPIDURAL; INFILTRATION; INTRACAUDAL; PERINEURAL at 10:07

## 2022-07-14 RX ADMIN — KETOROLAC TROMETHAMINE 30 MG: 30 INJECTION, SOLUTION INTRAMUSCULAR at 12:07

## 2022-07-14 RX ADMIN — ONDANSETRON HYDROCHLORIDE 4 MG: 2 SOLUTION INTRAMUSCULAR; INTRAVENOUS at 10:07

## 2022-07-14 RX ADMIN — CEFAZOLIN 2 G: 330 INJECTION, POWDER, FOR SOLUTION INTRAMUSCULAR; INTRAVENOUS at 10:07

## 2022-07-14 RX ADMIN — SODIUM CHLORIDE, SODIUM GLUCONATE, SODIUM ACETATE, POTASSIUM CHLORIDE AND MAGNESIUM CHLORIDE: 526; 502; 368; 37; 30 INJECTION, SOLUTION INTRAVENOUS at 10:07

## 2022-07-14 RX ADMIN — ROCURONIUM BROMIDE 50 MG: 10 SOLUTION INTRAVENOUS at 10:07

## 2022-07-14 RX ADMIN — SUGAMMADEX 200 MG: 100 INJECTION, SOLUTION INTRAVENOUS at 11:07

## 2022-07-14 RX ADMIN — DEXAMETHASONE SODIUM PHOSPHATE 8 MG: 4 INJECTION, SOLUTION INTRA-ARTICULAR; INTRALESIONAL; INTRAMUSCULAR; INTRAVENOUS; SOFT TISSUE at 10:07

## 2022-07-14 RX ADMIN — KETOROLAC TROMETHAMINE 30 MG: 30 INJECTION, SOLUTION INTRAMUSCULAR; INTRAVENOUS at 12:07

## 2022-07-14 RX ADMIN — PHENYLEPHRINE HYDROCHLORIDE 200 MCG: 10 INJECTION INTRAVENOUS at 11:07

## 2022-07-14 RX ADMIN — FENTANYL CITRATE 100 MCG: 50 INJECTION, SOLUTION INTRAMUSCULAR; INTRAVENOUS at 10:07

## 2022-07-14 RX ADMIN — ROPIVACAINE HYDROCHLORIDE 30 ML: 5 INJECTION, SOLUTION EPIDURAL; INFILTRATION; PERINEURAL at 09:07

## 2022-07-14 NOTE — TRANSFER OF CARE
Anesthesia Transfer of Care Note    Patient: Kulwinder Montenegro    Procedure(s) Performed: Procedure(s) (LRB):  ARTHROSCOPY,SHOULDER,WITH BICEPS TENODESIS (Right)    Patient location: PACU    Anesthesia Type: general    Transport from OR: Transported from OR on room air with adequate spontaneous ventilation    Post pain: adequate analgesia    Post assessment: no apparent anesthetic complications    Post vital signs: stable    Level of consciousness: sedated    Nausea/Vomiting: no nausea/vomiting    Complications: none    Transfer of care protocol was followed      Last vitals:   Visit Vitals  /68   Pulse 96   Temp 35.6 °C (96.1 °F) (Tympanic)   Resp 16   Ht 6' (1.829 m)   Wt 84 kg (185 lb 3 oz)   SpO2 (!) 93%   BMI 25.12 kg/m²

## 2022-07-14 NOTE — ANESTHESIA PROCEDURE NOTES
Peripheral Block    Patient location during procedure: holding area   Block not for primary anesthetic.  Reason for block: at surgeon's request and post-op pain management   Post-op Pain Location: Right Shoulder/Arm   Start time: 7/14/2022 9:47 AM  Timeout: 7/14/2022 9:45 AM   End time: 7/14/2022 9:52 AM    Staffing  Authorizing Provider: Carlos Eduardo Rosado MD  Performing Provider: Carlos Eduardo Rosado MD    Preanesthetic Checklist  Completed: patient identified, IV checked, site marked, risks and benefits discussed, surgical consent, monitors and equipment checked, pre-op evaluation and timeout performed  Peripheral Block  Patient position: supine  Prep: ChloraPrep  Patient monitoring: heart rate, cardiac monitor, continuous pulse ox, continuous capnometry and frequent blood pressure checks  Block type: supraclavicular  Laterality: right  Injection technique: single shot  Needle  Needle type: Stimuplex   Needle gauge: 22 G  Needle length: 4 in  Needle localization: anatomical landmarks and ultrasound guidance   -ultrasound image captured on disc.  Assessment  Injection assessment: negative aspiration, negative parasthesia and local visualized surrounding nerve  Paresthesia pain: none  Heart rate change: no  Slow fractionated injection: yes  Pain Tolerance: comfortable throughout block and no complaints  Medications:    Medications: ropivacaine (NAROPIN) injection 0.5% - Perineural   30 mL - 7/14/2022 9:47:00 AM    Additional Notes  After consent obtained/verified, questions entertained and answered, timeout performed.  With appropriate monitoring in place, the pt. was sedated by RN, (see nurse's flowsheet).    Block performed as noted above.    VSS.  [facility] RN monitoring vitals throughout procedure.  Patient tolerated procedure well.  The pt.appears to have an adequate block.

## 2022-07-14 NOTE — OR NURSING
09:39  TIMEOUT DONE    09:47  DR. OBREGON WILL ATTEMPT TO DO A RIGHT  SUPRACLAVICULAR NERVE BLOCK.    09:52  BLOCK COMPLETED AND TOLERATED WELL.

## 2022-07-14 NOTE — DISCHARGE SUMMARY
[facility] - Periop Services  Discharge Note  Short Stay    Procedure(s) (LRB):  ARTHROSCOPY,SHOULDER,WITH BICEPS TENODESIS (Right)    OUTCOME: Patient tolerated treatment/procedure well without complication and is now ready for discharge.    DISPOSITION: Home or Self Care    FINAL DIAGNOSIS:  <principal problem not specified>    FOLLOWUP: In clinic    DISCHARGE INSTRUCTIONS:  No discharge procedures on file.     TIME SPENT ON DISCHARGE: 10 minutes

## 2022-07-14 NOTE — ANESTHESIA PROCEDURE NOTES
Intubation    Date/Time: 7/14/2022 10:38 AM  Performed by: Larry Bolanos CRNA  Authorized by: Carlos Eduardo Rosado MD     Intubation:     Induction:  Intravenous    Intubated:  Postinduction    Attempts:  2    Attempted By:  CRNA    Method of Intubation:  Direct and video laryngoscopy    Blade:  Zepeda 2    Laryngeal View Grade: Grade III - only epiglottis visible      Attempted By (2nd Attempt):  CRNA    Method of Intubation (2nd Attempt):  Video laryngoscopy    Blade (2nd Attempt):  Santos 4    Laryngeal View Grade (2nd Attempt): Grade I - full view of cords      Difficult Airway Encountered?: Yes      Complications:  None    Airway Device:  Oral endotracheal tube    Airway Device Size:  7.5    Style/Cuff Inflation:  Cuffed    Tube secured:  22    Secured at:  The lips    Placement Verified By:  Capnometry    Complicating Factors:  None    Findings Post-Intubation:  BS equal bilateral

## 2022-07-14 NOTE — OP NOTE
.DATE OF SERVICE: 07/14/2022    SURGEON: Will Goldman MD    PREOPERATIVE DIAGNOSIS: Right shoulder biceps tendinitis, adhesive capsulitis  POSTOPERATIVE DIAGNOSIS: Right shoulder biceps tendinitis, adhesive capsulitis    PROCEDURE PERFORMED:   1. Right shoulder arthroscopic biceps tenodesis  2. Right shoulder arthroscopic lysis of adhesions, manipulation under anesthesia    ASSISTANT: Lucretia Estrada NP. Mrs. Estrada was essential in manipulating the arm I performed the procedure, suture shuttling and management, and closure    ANESTHESIA: General plus regional    ESTIMATED BLOOD LOSS: Minimal.    COMPLICATIONS: None.    IMPLANTS:    1. Biceps: Arthrex Tenodesis Screw 7mm     INDICATIONS FOR PROCEDURE: Kulwinder Montenegro is a 57 y.o. year old who has had ongoing right shoulder pain and weakness. The patient was seen in the clinic and preoperative imaging has revealed a biceps tendinitis and adhesive capsulitis. The patient has failed nonoperative treatment and has elected for operative intervention. Risks and benefits were thoroughly explained and consent was obtained prior to today's procedure.    DESCRIPTION OF PROCEDURE: The patient was seen in the preoperative holding area where the history and physical were reviewed without change. The operative shoulder was marked, consents were reviewed, and any questions were answered for the patient. A regional blockade of the shoulder was performed by the Anesthesia Service. The patient was induced under general anesthesia. Next the patient was placed upright into the beachchair position with care taken to ensure the cervical spine was well positioned and the face, eyes and all bony prominences well protected. Preoperative time-out led by the surgeon was performed verifying the patient, procedure, and preoperative antibiotics. The right upper extremity was then prepped and draped in the usual sterile fashion and secured to an arm davison.    A standard posterior portal was  established with a #11-blade.  The arthroscope was inserted into the glenohumeral joint atraumatically. The joint was insufflated with arthroscopic fluid. We then made a standard anterior portal using an #18-gauge spinal needle for guidance. An arthroscopic probe was inserted through the anterior portal and diagnostic arthroscopy begun.    This revealed a inflammed biceps tendon with an unstable. A torn superior labrum. An intact anterior, inferior and posterior labrum. The articular cartilage of the humeral head was intact. The articular cartilage of the glenoid was intact. The subscapularis tendon was intact. The articular side of the supraspinatus tendon was intact. The articular side of the infraspinatus tendon was intact.    He had inflammation throughout the anterior capsule and his rotator interval was scarred down.  I used the shaver and RF device in order to debride this.    At this point, we tagged our biceps with a spinal needle, and then used our arthroscopic punch to perform a tenotomy of the biceps tendon. We used arthroscopic shaver to debride any remnant biceps from the superior labrum. The arthroscope was then brought into the subacromial space and a standard lateral portal was created with needle guidance. A bursectomy of the subacromial bursa was performed with the shaver and radiofrequency ablator in the standard fashion. This was then carried anteriorly with the arm in forward flexion and external rotation. We identified the pectoralis major tendon then cleared out soft tissue along the anteriolateral humerus proximal to it with a VAPR and defined the bicipital groove. The biceps tendon was debrided completely free from its sheath and surrounding soft tissue. All soft tissue was removed from the section of the bicipital groove we planned to use for the tenodesis. Using spinal needle guidance we made an accessory anterior portal inferior and anterior to the lateral working portal. A 4cm PassPort  cannula was placed here. We placed a lasso permanent suture around the biceps tendon then drilled a 7mm hole through the accessory anterior portal into the proximal humerus. We placed a 7mm tenodesis screw with the biceps tendon into the hole. We cut the remnant proximal excess biceps tendon and removed this from the shoulder.    We identified the undersurface of the acromion. We used a VAPR to debride the undersurface of the acromion up to the anterior and lateral margins. We identified the CA ligament and reflected it off the acromion. We continued debridement of the bursal tissue, which was abundant. The bursal side of the rotator cuff was intact.  I cleaned out the anterior-posterior and lateral gutters.  I then gently manipulated the arm to obtain full motion.    Once we completed this, we took the remaining final arthroscopic pictures. We then removed our instruments, closed the portals with #3-0 monocryl suture. Sterile dressings were applied. The patient was then placed in an abduction pillow and sling, awoken from general anesthetic, and taken to recovery room in a stable condition.

## 2022-07-14 NOTE — ANESTHESIA PREPROCEDURE EVALUATION
07/14/2022  Kulwinder Montenegro is a 57 y.o., male presents with Right shoulder pain due to:.  Diagnosis:        Superior glenoid labrum lesion of right shoulder, initial encounter       Adhesive capsulitis of right shoulder  right shoulder pain since October 2021. The pains located anteriorly over the shoulder. He notes it worse with abduction and reaching behind him. It somewhat better at rest. He is tried anti-inflammatory medicines and home directed exercise program without relief since October 2021.     He comes to [facility] for the noted procedure under GETA w/ Supraclavicular block for postOp pain.  Procedure: ARTHROSCOPY,SHOULDER,WITH BICEPS TENODESIS (Right Shoulder)    PMHx:  Diabetes mellitus Hypercholesteremia         PSHx:  Surgical History  SHOULDER ARTHROSCOPY W/ BANKHART PROCEDURE ELBOW SURGERY   COLONOSCOPY          Lab Data:        Vitals:    Pre Vitals  Current as of 07/14/22 0932  BP: 117/74 Pulse: 88   Resp: 20 SpO2: 97   Temp: 35.6 °C (96.1 °F)   Height: 6' (1.829 m) (07/14/22) Weight: 84 kg (185 lb 3 oz) (07/14/22)   BMI: 25.1 IBW: 77.6 kg (171 lb 1.9 oz)   Last edited 07/14/22 0725 by FR      Pre-op Assessment    I have reviewed the Patient Summary Reports.     I have reviewed the Nursing Notes. I have reviewed the NPO Status.   I have reviewed the Medications.     Review of Systems  Anesthesia Hx:  No problems with previous Anesthesia    Social:  Non-Smoker    Hematology/Oncology:  Hematology Normal   Oncology Normal     EENT/Dental:EENT/Dental Normal   Cardiovascular:  Cardiovascular Normal Exercise tolerance: good   Functional Capacity good / => 4 METS    Pulmonary:  Pulmonary Normal    Renal/:  Renal/ Normal     Hepatic/GI:  Hepatic/GI Normal    Musculoskeletal:  Musculoskeletal Normal    Neurological:  Neurology Normal    Endocrine:   Diabetes    Dermatological:  Skin Normal     Psych:  Psychiatric Normal           Physical Exam  General: Alert, Oriented, Well nourished and Cooperative    Airway:  Mallampati: II   Mouth Opening: Normal  TM Distance: Normal  Tongue: Normal  Neck ROM: Normal ROM    Dental:  Intact    Chest/Lungs:  Clear to auscultation, Normal Respiratory Rate    Heart:  Rate: Normal  Rhythm: Regular Rhythm        Anesthesia Plan  Type of Anesthesia, risks & benefits discussed:    Anesthesia Type: Gen ETT  Intra-op Monitoring Plan: Standard ASA Monitors  Post Op Pain Control Plan: IV/PO Opioids PRN and peripheral nerve block  Induction:  IV and Inhalation  Airway Plan: Direct  Informed Consent: Informed consent signed with the Patient and all parties understand the risks and agree with anesthesia plan.  All questions answered. Patient consented to blood products? Yes  ASA Score: 2  Day of Surgery Review of History & Physical: H&P Update referred to the surgeon/provider.    Ready For Surgery From Anesthesia Perspective.     .

## 2022-07-14 NOTE — PATIENT INSTRUCTIONS
[facility]  Will Goldman Jr., MD  [address], [address]   [place] [address]   [phone], fax [phone]       SHOULDER ARTHROSCOPY    DIET:  Following general anesthesia, start with clear liquids to decrease chances of nausea.  Begin with water, coffee, tea, ginger ale, sprite, or apple juice.  If tolerated, advance to Jell-o, soup, crackers, or toast.  Once these are tolerated, advance to a regular diet.    DRESSING:  Keep the dressing clean and dry.  Remove the dressing on the 2nd day after surgery and replace the gauze with bandaids.  If you have steri-strips or band-aids in place of stitches, allow them to stay in place as long as possible.  They usually fall off on their own within 7-10 days.  You may trim the edges as the steri-strips begin to curl.  Steri-strips can get wet in the shower-pat dry with a towel after showers.    SHOWERING:  You may shower 5 days after surgery.  Remove the dressing or band-aids before showering.  Leave the incisions open to air after showering.  You can cover the sutures with band-aids if clothing irritates the stitches.  You do not need to reapply a dressing, but you may do so if you continue to have drainage.  It is not uncommon to have drainage a few days after surgery.      ACTIVITY:       -  Sleep as upright as possible using extra pillows as needed.  A recliner may also be helpful to sleep upright.  Do this for the first few days after surgery to help decrease pain and swelling.       -  Ice should be applied to the shoulder for 30 minutes, 5-6 times per day, for the 1st week to help decrease pain and swelling.  After the first week, apply as needed, especially after exercises and physical therapy.       - Wear the sling at all times including while sleeping.  The only time you may remove the sling is for showers and to perform the following exercises.    EXERCISES:  Perform the following exercises 3 times per day:       1.   Fully bend and straighten your fingers, wrist and elbow 10 times.       2.  Lean forward, bracing yourself on a table/chair with normal arm.  Let your surgery arm swing down in from and to the side of you in a gentle circular motion.  Use your upper body to generate the movement for this, NOT the surgery arm.  Your arm should move in gentle circles like a pendulum or elephant trunk (picture below)                        PAIN MEDICATION:       -  Use the Percocet as prescribed for pain after surgery.  Pain medicine can cause nausea and vomiting, especially on an empty stomach.       -  In addition, you can take Ketorolac as prescribed or Iburpofen 200 mg, 4 pills every 8 hours.  Ketorolac or Iburpofen medicine can irritate your stomach or cause heartburn.  If this happens to you, stop taking the medicine.       -  Ice and elevation are more useful than pain medicine for surgical pain.  If you are having too much pain or discomfort, try more ice and higher elevation.       -  Do NOT drive a vehicle or use heavy machinery while taking pain medication       -  Do NOT drink alcohol while taking pain medication.    BLOOD CLOT PREVENTION:  If you are aware that you are at high risk for blood blots, notify your physician.  In general, you should walk s much as possible after surgery to increase blood circulation throughout your body. Most patients will take Aspirin 81 mg, 1 pill twice a day for 2 weeks to help further prevent blood clots.    DRIVING OR FLYING:  In general, you should NOT drive while wearing a sling  You must NEVER drive while taking narcotic pain medication  You may ride in a car, take a train, or fly once you feel comfortable    PHYSICAL THERAPY:  Take your physical therapy prescription to the physical therapy clinic of your choice.  Make an appointment 1-2 days after surgery.  All exercises and activities must be within the protocol until rehab is complete.  Some patients will not start physical therapy  until after their first follow up appointment.    WORK OR SCHOOL:  You may return to an office job or school whenver comfortable.  Most patients return ~ 1 week after surgery.  For more active jobs that require extended walking, squatting, or lifting, you can wait until after your follow up appointment.  Any other types of jobs should be discussed with Dr. Goldman to determine a date for return to work.    PROBLEMS TO REPORT:       1.  Fever greater than 102 F       2.  Incision that is very red and/or draining pus       3.  Unable to urinate within 8 hours of surgery (a rare effect of being put to sleep for surgery)  Calls should be directed to the clinic:  [phone]    RETURN APPOINTMENT:  To confirm or reschedule your appointment, call [phone]

## 2022-07-15 VITALS
RESPIRATION RATE: 20 BRPM | HEIGHT: 72 IN | OXYGEN SATURATION: 95 % | WEIGHT: 185.19 LBS | SYSTOLIC BLOOD PRESSURE: 107 MMHG | DIASTOLIC BLOOD PRESSURE: 66 MMHG | TEMPERATURE: 96 F | BODY MASS INDEX: 25.08 KG/M2 | HEART RATE: 80 BPM

## 2022-07-18 NOTE — ANESTHESIA POSTPROCEDURE EVALUATION
Anesthesia Post Evaluation    Patient: Kulwinder Montenegro    Procedure(s) Performed: Procedure(s) (LRB):  ARTHROSCOPY,SHOULDER,WITH BICEPS TENODESIS (Right)    Final Anesthesia Type: general      Patient location during evaluation: PACU  Patient participation: Yes- Able to Participate  Level of consciousness: awake and alert and oriented  Post-procedure vital signs: reviewed and stable  Pain management: adequate  Airway patency: patent  KWAME mitigation strategies: Verification of full reversal of neuromuscular block  PONV status at discharge: No PONV  Anesthetic complications: no      Cardiovascular status: blood pressure returned to baseline and stable  Respiratory status: spontaneous ventilation and unassisted  Hydration status: euvolemic  Follow-up not needed.  Comments: [facility]          Vitals Value Taken Time   /66 07/14/22 1346   Temp 36.7 07/18/22 0733   Pulse 93 07/14/22 1346   Resp 20 07/14/22 1345   SpO2 94 % 07/14/22 1346   Vitals shown include unvalidated device data.      Event Time   Out of Recovery 12:52:00         Pain/Maritza Score: No data recorded

## 2022-07-19 LAB — POCT GLUCOSE: 186 MG/DL (ref 70–110)

## 2022-07-22 ENCOUNTER — OFFICE VISIT (OUTPATIENT)
Dept: ORTHOPEDICS | Facility: CLINIC | Age: 57
End: 2022-07-22
Payer: COMMERCIAL

## 2022-07-22 VITALS — HEIGHT: 72 IN | BODY MASS INDEX: 25.08 KG/M2 | WEIGHT: 185.19 LBS

## 2022-07-22 DIAGNOSIS — Z98.890 S/P ARTHROSCOPY OF RIGHT SHOULDER: Primary | ICD-10-CM

## 2022-07-22 PROCEDURE — 3008F PR BODY MASS INDEX (BMI) DOCUMENTED: ICD-10-PCS | Mod: CPTII,,, | Performed by: NURSE PRACTITIONER

## 2022-07-22 PROCEDURE — 3008F BODY MASS INDEX DOCD: CPT | Mod: CPTII,,, | Performed by: NURSE PRACTITIONER

## 2022-07-22 PROCEDURE — 1159F PR MEDICATION LIST DOCUMENTED IN MEDICAL RECORD: ICD-10-PCS | Mod: CPTII,,, | Performed by: NURSE PRACTITIONER

## 2022-07-22 PROCEDURE — 99024 PR POST-OP FOLLOW-UP VISIT: ICD-10-PCS | Mod: ,,, | Performed by: NURSE PRACTITIONER

## 2022-07-22 PROCEDURE — 1159F MED LIST DOCD IN RCRD: CPT | Mod: CPTII,,, | Performed by: NURSE PRACTITIONER

## 2022-07-22 PROCEDURE — 99024 POSTOP FOLLOW-UP VISIT: CPT | Mod: ,,, | Performed by: NURSE PRACTITIONER

## 2022-07-22 NOTE — PROGRESS NOTES
Chief Complaint:   Chief Complaint   Patient presents with    Right Shoulder - Post-op Evaluation    Post-op Evaluation     1wk s/p Right bicep tenodesis , ARMANDO, ROBERT. Doing good, working with PT. sx 7/14/22 GL 10/12/22       History of present illness:  7/14/22: Right shoulder arthroscopic biceps tenodesis, ARMANDO, ROBERT    He returns today. His pain is under good control. Working with therapy. Compliant in the sling.     Musculoskeletal:   Right shoulder incisions healing. Without erythema, drainage, or signs of infection. + bruising. Distally neurovascular intact.        Assessment: S/P arthroscopy of right shoulder        Plan:  Doing well s/p above. Continue therapy and sling. Follow up in 4 weeks for ROM check.

## 2022-08-24 ENCOUNTER — OFFICE VISIT (OUTPATIENT)
Dept: ORTHOPEDICS | Facility: CLINIC | Age: 57
End: 2022-08-24
Payer: COMMERCIAL

## 2022-08-24 VITALS — HEIGHT: 72 IN | WEIGHT: 185 LBS | BODY MASS INDEX: 25.06 KG/M2

## 2022-08-24 DIAGNOSIS — Z98.890 S/P ARTHROSCOPY OF RIGHT SHOULDER: Primary | ICD-10-CM

## 2022-08-24 PROCEDURE — 1159F MED LIST DOCD IN RCRD: CPT | Mod: CPTII,,, | Performed by: NURSE PRACTITIONER

## 2022-08-24 PROCEDURE — 1159F PR MEDICATION LIST DOCUMENTED IN MEDICAL RECORD: ICD-10-PCS | Mod: CPTII,,, | Performed by: NURSE PRACTITIONER

## 2022-08-24 PROCEDURE — 99024 POSTOP FOLLOW-UP VISIT: CPT | Mod: ,,, | Performed by: NURSE PRACTITIONER

## 2022-08-24 PROCEDURE — 3008F BODY MASS INDEX DOCD: CPT | Mod: CPTII,,, | Performed by: NURSE PRACTITIONER

## 2022-08-24 PROCEDURE — 3008F PR BODY MASS INDEX (BMI) DOCUMENTED: ICD-10-PCS | Mod: CPTII,,, | Performed by: NURSE PRACTITIONER

## 2022-08-24 PROCEDURE — 99024 PR POST-OP FOLLOW-UP VISIT: ICD-10-PCS | Mod: ,,, | Performed by: NURSE PRACTITIONER

## 2022-08-24 NOTE — PROGRESS NOTES
Chief Complaint:   Chief Complaint   Patient presents with    Right Shoulder - Pain    Follow-up     6wk s/p Right bicep tenodesis, NOEL ROBERTSA sx 7/14/22 GL 10/12/22, morenita states over all hes doing good still going to PT feels like hes almost there so strength is coming back here today for a ROM check       History of present illness:  7/14/22: Right shoulder arthroscopic biceps tenodesis, NOEL ROBERTSA    He returns today. His pain is under good control. Working with therapy. He reports discomfort with external rotation and had the same issue on his left shoulder post-op.     Musculoskeletal:   Right shoulder incisions healed. Forward flexion 90 active, 140 passive. External rotation 80. External rotation 30.        Assessment: S/P arthroscopy of right shoulder        Plan:  Doing well s/p above. Continue formal therapy. Follow up in 6 weeks for recheck.

## 2022-10-05 ENCOUNTER — OFFICE VISIT (OUTPATIENT)
Dept: ORTHOPEDICS | Facility: CLINIC | Age: 57
End: 2022-10-05
Payer: COMMERCIAL

## 2022-10-05 VITALS
SYSTOLIC BLOOD PRESSURE: 106 MMHG | DIASTOLIC BLOOD PRESSURE: 68 MMHG | HEART RATE: 77 BPM | BODY MASS INDEX: 25.06 KG/M2 | HEIGHT: 72 IN | WEIGHT: 185 LBS

## 2022-10-05 DIAGNOSIS — Z98.890 S/P ARTHROSCOPY OF RIGHT SHOULDER: Primary | ICD-10-CM

## 2022-10-05 DIAGNOSIS — M75.01 ADHESIVE CAPSULITIS OF RIGHT SHOULDER: ICD-10-CM

## 2022-10-05 PROCEDURE — 1159F MED LIST DOCD IN RCRD: CPT | Mod: CPTII,,, | Performed by: ORTHOPAEDIC SURGERY

## 2022-10-05 PROCEDURE — 3008F BODY MASS INDEX DOCD: CPT | Mod: CPTII,,, | Performed by: ORTHOPAEDIC SURGERY

## 2022-10-05 PROCEDURE — 3008F PR BODY MASS INDEX (BMI) DOCUMENTED: ICD-10-PCS | Mod: CPTII,,, | Performed by: ORTHOPAEDIC SURGERY

## 2022-10-05 PROCEDURE — 3074F PR MOST RECENT SYSTOLIC BLOOD PRESSURE < 130 MM HG: ICD-10-PCS | Mod: CPTII,,, | Performed by: ORTHOPAEDIC SURGERY

## 2022-10-05 PROCEDURE — 99024 PR POST-OP FOLLOW-UP VISIT: ICD-10-PCS | Mod: ,,, | Performed by: ORTHOPAEDIC SURGERY

## 2022-10-05 PROCEDURE — 3078F DIAST BP <80 MM HG: CPT | Mod: CPTII,,, | Performed by: ORTHOPAEDIC SURGERY

## 2022-10-05 PROCEDURE — 3078F PR MOST RECENT DIASTOLIC BLOOD PRESSURE < 80 MM HG: ICD-10-PCS | Mod: CPTII,,, | Performed by: ORTHOPAEDIC SURGERY

## 2022-10-05 PROCEDURE — 1159F PR MEDICATION LIST DOCUMENTED IN MEDICAL RECORD: ICD-10-PCS | Mod: CPTII,,, | Performed by: ORTHOPAEDIC SURGERY

## 2022-10-05 PROCEDURE — 99024 POSTOP FOLLOW-UP VISIT: CPT | Mod: ,,, | Performed by: ORTHOPAEDIC SURGERY

## 2022-10-05 PROCEDURE — 3074F SYST BP LT 130 MM HG: CPT | Mod: CPTII,,, | Performed by: ORTHOPAEDIC SURGERY

## 2022-10-05 RX ORDER — PEN NEEDLE, DIABETIC 32GX 5/32"
NEEDLE, DISPOSABLE MISCELLANEOUS
COMMUNITY
Start: 2022-09-14

## 2022-10-05 RX ORDER — INSULIN DEGLUDEC 100 U/ML
INJECTION, SOLUTION SUBCUTANEOUS
COMMUNITY
Start: 2022-09-09

## 2022-10-05 RX ORDER — MELOXICAM 15 MG/1
15 TABLET ORAL DAILY
Qty: 30 TABLET | Refills: 2 | Status: SHIPPED | OUTPATIENT
Start: 2022-10-05

## 2022-10-05 NOTE — PROGRESS NOTES
Chief Complaint:   Chief Complaint   Patient presents with    Right Upper Arm - Pain    Right Shoulder - Pain    Follow-up     6w f/u right bicep tenodesis, ARMANDO-ROBERT sx 7/14/22 g 10/12/22, reports limited ROM, reports aching and burning, attending PT       History of present illness:  7/14/22: Right shoulder arthroscopic biceps tenodesis, ARMANDO, ROBERT    He returns today. His pain is under good control. Working with therapy.     Musculoskeletal:   Right shoulder incisions healed. Forward flexion 140 active, Abduction 80. External rotation 30.        Assessment: S/P arthroscopy of right shoulder    Adhesive capsulitis of right shoulder    Other orders  -     meloxicam (MOBIC) 15 MG tablet; Take 1 tablet (15 mg total) by mouth once daily.  Dispense: 30 tablet; Refill: 2      Plan:  Doing well s/p above. Continue formal therapy.  Will start Mobic.  If his pain or stiffness persists will consider an injection.  Follow up in 6 weeks for recheck.

## 2022-10-25 ENCOUNTER — TELEPHONE (OUTPATIENT)
Dept: ORTHOPEDICS | Facility: CLINIC | Age: 57
End: 2022-10-25
Payer: COMMERCIAL

## 2022-10-25 NOTE — TELEPHONE ENCOUNTER
Patient called and stated that PT visits are capped out with insurance. Advised patient to continue working at home with stretching and strengthening. Patient understood.

## 2022-11-16 ENCOUNTER — OFFICE VISIT (OUTPATIENT)
Dept: ORTHOPEDICS | Facility: CLINIC | Age: 57
End: 2022-11-16
Payer: COMMERCIAL

## 2022-11-16 VITALS
BODY MASS INDEX: 27.37 KG/M2 | SYSTOLIC BLOOD PRESSURE: 124 MMHG | WEIGHT: 201.81 LBS | HEART RATE: 76 BPM | DIASTOLIC BLOOD PRESSURE: 79 MMHG | TEMPERATURE: 98 F

## 2022-11-16 DIAGNOSIS — M75.01 ADHESIVE CAPSULITIS OF RIGHT SHOULDER: Primary | ICD-10-CM

## 2022-11-16 PROCEDURE — 3078F PR MOST RECENT DIASTOLIC BLOOD PRESSURE < 80 MM HG: ICD-10-PCS | Mod: CPTII,,, | Performed by: ORTHOPAEDIC SURGERY

## 2022-11-16 PROCEDURE — 99213 OFFICE O/P EST LOW 20 MIN: CPT | Mod: ,,, | Performed by: ORTHOPAEDIC SURGERY

## 2022-11-16 PROCEDURE — 1159F MED LIST DOCD IN RCRD: CPT | Mod: CPTII,,, | Performed by: ORTHOPAEDIC SURGERY

## 2022-11-16 PROCEDURE — 3074F SYST BP LT 130 MM HG: CPT | Mod: CPTII,,, | Performed by: ORTHOPAEDIC SURGERY

## 2022-11-16 PROCEDURE — 3078F DIAST BP <80 MM HG: CPT | Mod: CPTII,,, | Performed by: ORTHOPAEDIC SURGERY

## 2022-11-16 PROCEDURE — 3074F PR MOST RECENT SYSTOLIC BLOOD PRESSURE < 130 MM HG: ICD-10-PCS | Mod: CPTII,,, | Performed by: ORTHOPAEDIC SURGERY

## 2022-11-16 PROCEDURE — 99213 PR OFFICE/OUTPT VISIT, EST, LEVL III, 20-29 MIN: ICD-10-PCS | Mod: ,,, | Performed by: ORTHOPAEDIC SURGERY

## 2022-11-16 PROCEDURE — 1159F PR MEDICATION LIST DOCUMENTED IN MEDICAL RECORD: ICD-10-PCS | Mod: CPTII,,, | Performed by: ORTHOPAEDIC SURGERY

## 2022-11-16 PROCEDURE — 3008F PR BODY MASS INDEX (BMI) DOCUMENTED: ICD-10-PCS | Mod: CPTII,,, | Performed by: ORTHOPAEDIC SURGERY

## 2022-11-16 PROCEDURE — 3008F BODY MASS INDEX DOCD: CPT | Mod: CPTII,,, | Performed by: ORTHOPAEDIC SURGERY

## 2022-11-16 NOTE — PROGRESS NOTES
Chief Complaint:   Chief Complaint   Patient presents with    Right Upper Arm - Follow-up    Follow-up     4 month S/P Right bicep tenodesis 7/14/22 still not 100 percent but no pain unless he lifts arm up, feels pain in shoulder area. Pt once a week. no pain meds.       Consulting Physician: No ref. provider found    History of present illness:  7/14/22: Right shoulder arthroscopic biceps tenodesis, ARMANDO, ROBERT    He returns today.  His pain and motion are improving.  He is doing therapy once a week.  He thinks the Mobic helped.    Past Medical History:   Diagnosis Date    Diabetes mellitus     Hypercholesteremia        Past Surgical History:   Procedure Laterality Date    ARTHROSCOPY,SHOULDER,WITH BICEPS TENODESIS Right 7/14/2022    Procedure: ARTHROSCOPY,SHOULDER,WITH BICEPS TENODESIS;  Surgeon: Will Goldman Jr., MD;  Location: [facility];  Service: Orthopedics;  Laterality: Right;    COLONOSCOPY      ELBOW SURGERY      tennis    SHOULDER ARTHROSCOPY W/ BANKHART PROCEDURE Left        Current Outpatient Medications   Medication Sig    co-enzyme Q-10 30 mg capsule Take 30 mg by mouth 3 (three) times daily.    cyanocobalamin (VITAMIN B-12) 100 MCG tablet Take 100 mcg by mouth once daily.    gabapentin (NEURONTIN) 300 MG capsule Take 300 mg by mouth 3 (three) times daily.    glipiZIDE (GLUCOTROL) 10 MG TR24 Take 10 mg by mouth 2 (two) times a day.    JARDIANCE 25 mg tablet Take 25 mg by mouth once daily.    ketorolac (TORADOL) 10 mg tablet Take 1 tablet (10 mg total) by mouth 3 (three) times daily.    linaCLOtide (LINZESS) 145 mcg Cap capsule Take 145 mcg by mouth Daily.    meloxicam (MOBIC) 15 MG tablet Take 1 tablet (15 mg total) by mouth once daily.    metFORMIN (GLUCOPHAGE) 1000 MG tablet Take 1,000 mg by mouth 2 (two) times daily with meals.    methocarbamoL (ROBAXIN) 750 MG Tab Take 1 tablet (750 mg total) by mouth 3 (three) times daily.    oxyCODONE-acetaminophen (PERCOCET) 5-325 mg per tablet Take 1 tablet by  "mouth every 6 (six) hours as needed for Pain.    rosuvastatin (CRESTOR) 40 MG Tab Take 40 mg by mouth every evening.    RYBELSUS 14 mg tablet Take 14 mg by mouth once daily.    TRESIBA FLEXTOUCH U-100 100 unit/mL (3 mL) insulin pen Inject into the skin.    ULTICARE PEN NEEDLE 31 gauge x 5/16" Ndle SMARTSI SUB-Q Daily PRN    zolpidem (AMBIEN) 10 mg Tab Take 10 mg by mouth nightly as needed.     No current facility-administered medications for this visit.       Review of patient's allergies indicates:  No Known Allergies    History reviewed. No pertinent family history.    Social History     Socioeconomic History    Marital status:    Tobacco Use    Smoking status: Never    Smokeless tobacco: Never   Substance and Sexual Activity    Alcohol use: Yes     Alcohol/week: 6.0 standard drinks     Types: 6 Cans of beer per week     Comment: occas    Drug use: Never    Sexual activity: Yes       Review of Systems:    Constitution:   Denies chills, fever, and sweats.  HENT:   Denies headaches or blurry vision.  Cardiovascular:  Denies chest pain or irregular heart beat.  Respiratory:   Denies cough or shortness of breath.  Gastrointestinal:  Denies abdominal pain, nausea, or vomiting.  Musculoskeletal:   Denies muscle cramps.  Neurological:   Denies dizziness or focal weakness.  Psychiatric/Behavior: Normal mental status.  Hematology/Lymph:  Denies bleeding problem or easy bruising/bleeding.  Skin:    Denies rash or suspicious lesions.    Examination:    Vital Signs:    Vitals:    22 0809   BP: 124/79   Pulse: 76   Temp: 97.7 °F (36.5 °C)   Weight: 91.5 kg (201 lb 12.8 oz)       Body mass index is 27.37 kg/m².    Constitution:   Well-developed, well nourished patient in no acute distress.  Neurological:   Alert and oriented x 3 and cooperative to examination.     Psychiatric/Behavior: Normal mental status.  Respiratory:   No shortness of breath.  Eyes:    Extraoccular muscles intact  Skin:    No scars, rash or " suspicious lesions.    MSK:   Shoulder Exam:                   Right        Left  Skin:                                   Normal     Normal  AC joint tenderness:           None         None  Forward Flexion:                160            180  Abduction:                          180           180  External Rotation:               80              80  Internal Rotation:                80             80  Supraspinatus stress test: Neg           Neg  Hawkin's Impingement:     Neg           Neg  Neer Impingement:            Neg           Neg  Apprehension:                   Neg           Neg  [place]:                           Neg           Neg  Speed's test:                     Neg            Neg  Strength:  External Rotation:           5/5                5/5  Lift Off/belly press:          5/5                5/5    N-V status:                   Intact             Intact    C-spine: Normal ROM, NT        Assessment: Adhesive capsulitis of right shoulder        Plan:  Doing well status post above.  Activities as tolerated.  I will see him back as needed

## (undated) DEVICE — GLOVE PROTEXIS HYDROGEL SZ8

## (undated) DEVICE — Device

## (undated) DEVICE — PEROXIDE HYDROGEN 3% 16OZ

## (undated) DEVICE — GLOVE PROTEXIS HYDROGEL SZ6

## (undated) DEVICE — COVER MAYO STAND REINFRCD 30

## (undated) DEVICE — DRAPE U-DRAPE ADHESIVE 60X60IN

## (undated) DEVICE — GAUZE SPONGE 4'X4 12 PLY

## (undated) DEVICE — APPLICATOR CHLORAPREP ORN 26ML

## (undated) DEVICE — TUBE SET INFLOW/OUTFLOW

## (undated) DEVICE — DRAPE STERI U-SHAPED 47X51IN

## (undated) DEVICE — SOL NACL IRR 3000ML

## (undated) DEVICE — GAUZE SPONGE 4X4 12PLY

## (undated) DEVICE — DRAPE INCISE IOBAN 2 23X23IN

## (undated) DEVICE — NDL SUREFIRE SCORPION RC

## (undated) DEVICE — KIT SURGICAL TURNOVER

## (undated) DEVICE — CUBE COLD THERAPY POLAR PAD

## (undated) DEVICE — KIT TRIMANO

## (undated) DEVICE — GLOVE PROTEXIS LTX MICRO 8

## (undated) DEVICE — SUT FIBERWIRE

## (undated) DEVICE — BLADE SURG CARBON STEEL SZ11

## (undated) DEVICE — PAD ABD 8X10 STERILE

## (undated) DEVICE — SUT 3-0 MONOCRYL PLUS PS-2

## (undated) DEVICE — SEE MEDLINE ITEM 157160

## (undated) DEVICE — GLOVE PROTEXIS BLUE LATEX 6.5

## (undated) DEVICE — DRAPE STERI INSTRUMENT 1018

## (undated) DEVICE — GOWN SURGICAL XX LARGE X LONG

## (undated) DEVICE — CANNULA PASSPORT 8 MM X 4CM.

## (undated) DEVICE — PROBE ARTHO ENERGY 90 DEG

## (undated) DEVICE — ELECTRODE PATIENT RETURN DISP

## (undated) DEVICE — TAPE ADH MEDIPORE 4 X 10YDS

## (undated) DEVICE — CLOSURE SKIN STERI STRIP 1/2X4

## (undated) DEVICE — BLADE SHAVER LANZA 4.2X13CM

## (undated) DEVICE — TUBE SUCTION MEDI-VAC STERILE

## (undated) DEVICE — NDL ANES SPINAL 18X3.5ST 18G